# Patient Record
Sex: FEMALE | Race: WHITE
[De-identification: names, ages, dates, MRNs, and addresses within clinical notes are randomized per-mention and may not be internally consistent; named-entity substitution may affect disease eponyms.]

---

## 2018-06-11 ENCOUNTER — HOSPITAL ENCOUNTER (OUTPATIENT)
Dept: HOSPITAL 74 - JASUSAT | Age: 58
LOS: 1 days | Discharge: HOME | End: 2018-06-12
Attending: OBSTETRICS & GYNECOLOGY
Payer: COMMERCIAL

## 2018-06-11 VITALS — BODY MASS INDEX: 23 KG/M2

## 2018-06-11 DIAGNOSIS — N73.6: ICD-10-CM

## 2018-06-11 DIAGNOSIS — N87.9: Primary | ICD-10-CM

## 2018-06-11 PROCEDURE — 0UT9FZZ RESECTION OF UTERUS, VIA NATURAL OR ARTIFICIAL OPENING WITH PERCUTANEOUS ENDOSCOPIC ASSISTANCE: ICD-10-PCS | Performed by: OBSTETRICS & GYNECOLOGY

## 2018-06-11 PROCEDURE — 0UT2FZZ RESECTION OF BILATERAL OVARIES, VIA NATURAL OR ARTIFICIAL OPENING WITH PERCUTANEOUS ENDOSCOPIC ASSISTANCE: ICD-10-PCS | Performed by: OBSTETRICS & GYNECOLOGY

## 2018-06-11 PROCEDURE — 0UT7FZZ RESECTION OF BILATERAL FALLOPIAN TUBES, VIA NATURAL OR ARTIFICIAL OPENING WITH PERCUTANEOUS ENDOSCOPIC ASSISTANCE: ICD-10-PCS | Performed by: OBSTETRICS & GYNECOLOGY

## 2018-06-11 RX ADMIN — ACETAMINOPHEN PRN MG: 325 TABLET ORAL at 22:02

## 2018-06-11 RX ADMIN — IBUPROFEN PRN MG: 400 TABLET, FILM COATED ORAL at 22:03

## 2018-06-11 NOTE — OP
Operative Note





- Note:


Operative Date: 06/11/18


Pre-Operative Diagnosis: Cervical dysplasia, pelvic pain


Operation: TLH, BSO, cystoscopy


Findings: 





Small uterus. Multiple adhesions of bowel to LLQ/pelvic sidewall/bladder, right 

abdominal wall from RLQ to RUQ


Surgeon: Shayan Brown


Assistant: Rossana Persaud


Anesthesiologist/CRNA: Christina Jones MD


Anesthesia: General


Specimens Removed: Uterus, tubes, ovaries


Estimated Blood Loss (mls): 100


Drains & Tubes with Location: Alford cath


Drains, Volume Out (mls): 100


Blood Volume Replaced (mls): 0


Fluid Volume Replaced (mls): 2,000


Operative Report Dictated: Yes

## 2018-06-11 NOTE — OP
DATE OF OPERATION:  06/11/2018

 

PREOPERATIVE DIAGNOSES:  Cervical dysplasia, pelvic pain.

 

POSTOPERATIVE DIAGNOSES:  Cervical dysplasia, pelvic pain.

 

PROCEDURE:  Total laparoscopic hysterectomy, bilateral salpingo-oophorectomy,

cystoscopy.

 

SURGEON:  Shayan Bronw MD

 

ASSISTANT:  Rossana Persaud MD

 

ANESTHESIOLOGIST:  Christina Jones MD

 

ANESTHESIA:  General.

 

COMPLICATIONS:  None.

 

ESTIMATED BLOOD LOSS:  100 mL

 

INTRAVENOUS FLUIDS:  2000 mL

 

URINE OUTPUT:  Clear urine 100 mL at the end of the procedure.

 

PATHOLOGY:  Uterus with cervix, bilateral fallopian tubes and ovaries.

 

FINDINGS:  Examination under anesthesia revealed a small anteverted uterus.  No

pelvic or adnexal masses.  The cervix was noted to be atrophic and flush with the

vagina.  During laparoscopy, multiple dense adhesions were noted between the bowel

and the bladder and the left pelvic sidewall as well as the bowel and the anterior

abdominal wall extending from the right lower quadrant all the way to the right upper

quadrant.  Normal atrophic ovaries.  Normal bilateral fallopian tubes and uterus.

 

DESCRIPTION OF PROCEDURE:  The patient was met preoperatively.  Risks, benefits, and

alternatives of surgery were discussed in details.  All questions were answered.  The

patient was brought to the OR with the IV running.  She was placed on a surgical

table in the supine position.  The general endotracheal anesthesia was achieved

without difficulty.  The patient was then placed in a dorsal lithotomy position using

adjustable Jaskaran stirrups.  The patient was examined under anesthesia.  The timeout

procedure was conducted as per standard protocol.  The patient was then prepped and

draped in the usual sterile fashion.  A Alford catheter was introduced inside the

bladder and left to drain to gravity.  The uterus was grasped with a single-tooth

tenaculum.  The cervical os was localized and dilated to accommodate the size 21

Richards dilator, and a uterine manipulator was inserted.

 

Attention was then turned to the patient's abdomen.  A 5-mm intraumbilical incision

was made with a knife.  A Veress needle was introduced without complications. 

Pneumoperitoneum was created with intraabdominal pressure limited to 20 mmHg.  The

Veress needle was then removed, and an Optiview trocar was placed through the

umbilical incision.  A second 5-mm incision was made in the left lower quadrant, and

the trocar was inserted under direct visualization.  Multiple adhesions of the bowel

were lysed from the right anterior abdominal wall.  Once the space was created, a

right lower quadrant trocar was inserted without complications.  At that point, the

attention was turned to the left pelvic sidewall, and multiple adhesions of the bowel

were lysed from the left pelvic sidewall and the bladder.  Once this was completed,

both ureters were visualized and traced to the cardinal ligament.  The left

infundibulopelvic ligament was then cauterized and transected.  The dissection was

carried down to the level of the round ligament.  The round ligament was transected

with good hemostasis.  The bladder reflection was then dissected away from the lower

uterine segment.  The bladder was also dissected away from the pubocervical fascia. 

The right IP ligament was then cauterized and transected.  The dissection was then

carried down to the right round ligament.  The right round ligament was also

cauterized and transected, and the bladder reflection was then created by dissecting

the bladder away from the right pubovesical fascia.  The bilateral ascending uterine

vessels were skeletonized, cauterized, and transected with good hemostasis.  The

colpotomy incision was then made with the Harmonic scalpel.  The colpotomy incision

was carried down in a circumferential fashion until the cervix was completely severed

from the vagina.  The uterus was then removed vaginally.  The vaginal cuff was then

closed using a 2-0 V-Loc suture with a running stitch.  Good hemostasis was noted. 

The cystoscopy was then performed, and both ureters appeared to be functioning

normally, and both ureteral jets were visualized.  The bladder appeared to be intact.

 The Alford catheter was then reinserted, and the surgeons returned to laparoscopy.

 

At that time, a small amount of bleeding was noted from the vaginal cuff and left

pelvic sidewall.  Judicious use of cautery was used to achieve hemostasis, followed

by an application of FloSeal to the vaginal cuff and left pelvic sidewall.  Following

this, good hemostasis was noted.  All of the instruments were removed from the

patient.  Sponge, lap, and needle counts were correct.  The patient was returned to

supine position.  She was transferred to recovery room awake and in stable condition.

 

 

NEVILLE [id number]

DD: 06/11/2018 12:34

DT: 06/11/2018 20:31

Job #:  [id number]

## 2018-06-12 VITALS — DIASTOLIC BLOOD PRESSURE: 63 MMHG | HEART RATE: 74 BPM | SYSTOLIC BLOOD PRESSURE: 117 MMHG | TEMPERATURE: 98.2 F

## 2018-06-12 RX ADMIN — ACETAMINOPHEN PRN MG: 325 TABLET ORAL at 05:59

## 2018-06-12 RX ADMIN — IBUPROFEN PRN MG: 400 TABLET, FILM COATED ORAL at 05:58

## 2018-06-12 NOTE — PN
Progress Note (SOAP)





- Subjective


Chief Complaint: 





The pt is doing well, stable, afebrile, (+) flatus, no pain


History of Present Illness: 





POD#1 s/p TLH, BSO, cystoscopy. 





- Current Medications


Current Medications: 


Active Medications





Acetaminophen (Tylenol -)  650 mg PO Q4H PRN


   PRN Reason: PAIN OR FEVER


   Last Admin: 06/12/18 05:59 Dose:  650 mg


Lactated Ringer's (Lactated Ringers Solution)  1,000 mls @ 125 mls/hr IV ASDIR 

JENNIFER


   Last Admin: 06/11/18 14:00 Dose:  0 mls


Ibuprofen (Motrin -)  400 mg PO Q4H PRN


   PRN Reason: FEVER


   Last Admin: 06/12/18 05:58 Dose:  400 mg


Ondansetron HCl (Zofran Injection)  4 mg IVPUSH Q6H PRN


   PRN Reason: NAUSEA


Ondansetron HCl (Zofran Injection)  4 mg IVPUSH Q6H PRN


   PRN Reason: NAUSEA AND/OR VOMITING











- Objective


Vital Signs: 


 Vital Signs











Temperature  98.4 F   06/12/18 08:03


 


Pulse Rate  67   06/12/18 08:03


 


Respiratory Rate  20   06/12/18 08:03


 


Blood Pressure  118/65   06/12/18 08:03


 


O2 Sat by Pulse Oximetry (%)  99   06/11/18 14:15











Constitutional: Yes: Well Nourished, No Distress, Calm


Eyes: Yes: WNL, Conjunctiva Clear


HENT: Yes: WNL, Atraumatic, Normocephalic


Neck: Yes: WNL, Supple, Trachea Midline


Cardiovascular: Yes: WNL, Regular Rate and Rhythm


Respiratory: Yes: WNL, Regular, CTA Bilaterally


Gastrointestinal: Yes: WNL, Normal Bowel Sounds, Soft


...Rectal Exam: Yes: Deferred


Genitourinary: Yes: WNL


Musculoskeletal: Yes: WNL


Extremities: Yes: WNL


Peripheral Pulses WNL: Yes


Edema: No


Integumentary: Yes: WNL


Wound/Incision: Yes: Clean/Dry, Well Approximated, Sutures Intact, Dressing Dry 

and Intact


Neurological: Yes: WNL, Alert, Oriented


...Motor Strength: Yes: WNL


Psychiatric: Yes: WNL





Assessment/Plan





POD#1 s/p TLH, BSO, cystoscopy


Pt is doing well. She is afebrile.


Postop instruction reviewed


Plan to d/c home.

## 2018-06-12 NOTE — PN
Progress Note, Physician


Chief Complaint: 


Pt. sitting comfortably in chair, no GA complaints.








- Current Medication List


Current Medications: 


Active Medications





Acetaminophen (Tylenol -)  650 mg PO Q4H PRN


   PRN Reason: PAIN OR FEVER


   Last Admin: 06/12/18 05:59 Dose:  650 mg


Lactated Ringer's (Lactated Ringers Solution)  1,000 mls @ 125 mls/hr IV ASDIR 

JENNIFER


   Last Admin: 06/11/18 14:00 Dose:  0 mls


Ibuprofen (Motrin -)  400 mg PO Q4H PRN


   PRN Reason: FEVER


   Last Admin: 06/12/18 05:58 Dose:  400 mg


Ondansetron HCl (Zofran Injection)  4 mg IVPUSH Q6H PRN


   PRN Reason: NAUSEA


Ondansetron HCl (Zofran Injection)  4 mg IVPUSH Q6H PRN


   PRN Reason: NAUSEA AND/OR VOMITING











- Objective


Vital Signs: 


 Vital Signs











Temperature  98.4 F   06/12/18 08:03


 


Pulse Rate  67   06/12/18 08:03


 


Respiratory Rate  20   06/12/18 08:03


 


Blood Pressure  118/65   06/12/18 08:03


 


O2 Sat by Pulse Oximetry (%)  99   06/11/18 14:15











Constitutional: Yes: Well Nourished, No Distress, Calm


Musculoskeletal: Yes: WNL


Neurological: Yes: WNL, Alert, Oriented


...Motor Strength: WNL





Assessment/Plan


POD#1 s/p Laparoscopic TAHBSO under GA. Doing well.


D/C from anesthesia care.

## 2018-06-12 NOTE — PN
Progress Note, Physician





- Current Medication List


Current Medications: 


Active Medications





Acetaminophen (Tylenol -)  650 mg PO Q4H PRN


   PRN Reason: PAIN OR FEVER


   Last Admin: 06/12/18 05:59 Dose:  650 mg


Lactated Ringer's (Lactated Ringers Solution)  1,000 mls @ 125 mls/hr IV ASDIR 

JENNIFER


   Last Admin: 06/11/18 14:00 Dose:  0 mls


Ibuprofen (Motrin -)  400 mg PO Q4H PRN


   PRN Reason: FEVER


   Last Admin: 06/12/18 05:58 Dose:  400 mg


Ondansetron HCl (Zofran Injection)  4 mg IVPUSH Q6H PRN


   PRN Reason: NAUSEA


Ondansetron HCl (Zofran Injection)  4 mg IVPUSH Q6H PRN


   PRN Reason: NAUSEA AND/OR VOMITING











- Objective


Vital Signs: 


 Vital Signs











Temperature  98.6 F   06/12/18 01:17


 


Pulse Rate  79   06/12/18 01:17


 


Respiratory Rate  20   06/12/18 01:17


 


Blood Pressure  113/64   06/12/18 01:17


 


O2 Sat by Pulse Oximetry (%)  99   06/11/18 14:15

## 2018-06-15 NOTE — PATH
Surgical Pathology Report



Patient Name:  RUPINDER PENA

Accession #:  [id number]

Med. Rec. #:  [id number]                                                        

   /Age/Gender:  1960 (Age: 58) / F

Account:  [id number]                                                          

             Location: AMBULATORY SURG

Taken:  2018

Received:  2018

Reported:  6/15/2018

Physicians:  Shayan Brown M.D.

  



Specimen(s) Received

 UTERUS AND CERVIX, BILATERAL FALLOPIAN TUBES AND BILATERAL OVARIES 





Clinical History

Cervical dysplasia, pelvic pain







Final Diagnosis

UTERUS, CERVIX, BILATERAL FALLOPIAN TUBES, BILATERAL OVARIES, HYSTERECTOMY AND

SALPINGO-OOPHORECTOMY:

CERVIX WITH FOCAL EPITHELIAL EROSION, HEMORRHAGE (RECENT AND OLD), AND MILD

CHRONIC INFLAMMATION.

NEGATIVE FOR DYSPLASIA.

ATROPHIC ENDOMETRIUM.

BILATERAL FALLOPIAN TUBES AND OVARIES WITH NO DIAGNOSTIC ABNORMALITIES.



Comment: Immunohistochemical stain P16 and Ki-67 done at block 3, 4 and 5

(performed at [facility], ET [id number]) showing negative staining for

P16, K- 67 staining is limited to the basal layer of the squamous epithelium.

Findings are support the above diagnosis.

Positive and negative controls (internal if applicable) show appropriate

results.







***Electronically Signed***

Abby Gonzalez M.D.





Gross Description

Received in formalin labeled "uterus, cervix, bilateral fallopian tubes,

bilateral ovary," is a 170 g hysterectomy specimen with bilaterally attached

fallopian tubes and ovaries. The uterus measures 7 cm from superior to

inferior,5 cm from left to right and 5 cm from anterior to posterior. The serosa

is tan-pink and smooth. Parametria measures 1 x 0.3 (right) and 1.2 x 0.4

(left). The attached cervix measures 2.5 cm in end 2 cm in diameter. The

ectocervix is tan with focal fibrous areas. The anterior surgical margin is

inked in blue. The posterior surgical margin is inked in black. The endocervix

is unremarkable. The endometrial cavity measures 5 cm in length and 2.5 cm from

cornu to cornu. The endometrium is focally hemorrhagic, measuring up to 0.1 cm

in thickness. The myometrium is tan-pink and trabeculated, measuring up to 1.3

cm in thickness. No intramural nodules/lesions are identified. The right

fimbriated fallopian tube measures 7 cm in length. The outer surface is tan-gray

and smooth. Sectioning reveals an unremarkable lumen. The right ovary measures

2.5 x 1 x 0.8 cm. The outer surface is tan-gray and cerebriform. The left

fimbriated fallopian tube measures 7 cm in length. The outer surface is tan-gray

and smooth. Sectioning reveals an unremarkable lumen. The right ovary measures

2.5 x 0.5 x 0.7 cm. The outer surface is tan-gray and cerebriform. The entire

cervix is submitted using clock orientation: 12-3:00, 3-6:00, 6-9:00, and

9-12:00. Representative sections are submitted in 19 cassettes as follows:

1-cervix, 12-3:00; 2-3: cervix, 3-6:00; 4-5: cervix, 6-9:00; 6-7: cervix,

9-12:00, 8-anterior lower uterine segment; 9-posterior lower uterine segment;

10-anterior endometrium and myometrium; 11-posterior endometrium and myometrium;

12-right parametrium, 13-left parametrium, 14-right ovary; 15-16-right fallopian

tube; 17-left ovary; 18-19- left fallopian tube.

SHEELA/2018



nagi